# Patient Record
Sex: MALE | URBAN - METROPOLITAN AREA
[De-identification: names, ages, dates, MRNs, and addresses within clinical notes are randomized per-mention and may not be internally consistent; named-entity substitution may affect disease eponyms.]

---

## 2024-08-15 ENCOUNTER — OFFICE VISIT (OUTPATIENT)
Dept: URBAN - METROPOLITAN AREA CLINIC 74 | Facility: CLINIC | Age: 54
End: 2024-08-15
Payer: COMMERCIAL

## 2024-08-15 ENCOUNTER — DASHBOARD ENCOUNTERS (OUTPATIENT)
Age: 54
End: 2024-08-15

## 2024-08-15 ENCOUNTER — LAB OUTSIDE AN ENCOUNTER (OUTPATIENT)
Dept: URBAN - METROPOLITAN AREA CLINIC 74 | Facility: CLINIC | Age: 54
End: 2024-08-15

## 2024-08-15 VITALS
TEMPERATURE: 97.9 F | WEIGHT: 258.4 LBS | HEART RATE: 74 BPM | SYSTOLIC BLOOD PRESSURE: 128 MMHG | HEIGHT: 68 IN | BODY MASS INDEX: 39.16 KG/M2 | OXYGEN SATURATION: 99 % | DIASTOLIC BLOOD PRESSURE: 86 MMHG

## 2024-08-15 DIAGNOSIS — Z12.11 SCREENING FOR COLON CANCER: ICD-10-CM

## 2024-08-15 PROCEDURE — 99202 OFFICE O/P NEW SF 15 MIN: CPT | Performed by: PHYSICIAN ASSISTANT

## 2024-08-15 NOTE — HPI-TODAY'S VISIT:
The patient is 53-year-old male with past medical history as noted below is presenting to our clinic today to schedule colon cancer screening. The patient denies family history of colon polyps or colon cancer. No previous Colonoscopy. He takes Omperazole only as needed when he eats spicey food. No GI issues today.    -- The patient denies dyspepsia, dysphagia, odynophagia, hemoptysis, hematemesis, nausea, vomiting, regurgitation, melena, constipation, diarrhea, abdominal pain, hematochezia, fever, chills, chest pain, SOB, or any other GI complaints today.   -- The patient denies ETOH, Tobacco, and Illicit drug use.   -- The patient is not up to date with Flu and COVID vaccine.  -- Denies NSID's.

## 2024-08-20 ENCOUNTER — TELEPHONE ENCOUNTER (OUTPATIENT)
Dept: URBAN - METROPOLITAN AREA CLINIC 74 | Facility: CLINIC | Age: 54
End: 2024-08-20

## 2024-08-29 ENCOUNTER — OFFICE VISIT (OUTPATIENT)
Dept: URBAN - METROPOLITAN AREA CLINIC 74 | Facility: CLINIC | Age: 54
End: 2024-08-29

## 2024-09-26 ENCOUNTER — OFFICE VISIT (OUTPATIENT)
Dept: URBAN - METROPOLITAN AREA SURGERY CENTER 30 | Facility: SURGERY CENTER | Age: 54
End: 2024-09-26

## 2024-10-23 ENCOUNTER — TELEPHONE ENCOUNTER (OUTPATIENT)
Dept: URBAN - METROPOLITAN AREA CLINIC 74 | Facility: CLINIC | Age: 54
End: 2024-10-23

## 2024-10-24 ENCOUNTER — OFFICE VISIT (OUTPATIENT)
Dept: URBAN - METROPOLITAN AREA TELEHEALTH 2 | Facility: TELEHEALTH | Age: 54
End: 2024-10-24
Payer: COMMERCIAL

## 2024-10-24 DIAGNOSIS — D12.6 TUBULAR ADENOMA OF COLON: ICD-10-CM

## 2024-10-24 DIAGNOSIS — K64.8 INTERNAL HEMORRHOIDS: ICD-10-CM

## 2024-10-24 PROCEDURE — 99441 PHONE E/M BY PHYS 5-10 MIN: CPT | Performed by: PHYSICIAN ASSISTANT

## 2024-10-24 RX ORDER — OMEPRAZOLE 20 MG/1
1 CAPSULE 1/2 TO 1 HOUR BEFORE MORNING MEAL CAPSULE, DELAYED RELEASE ORAL ONCE A DAY
Status: ACTIVE | COMMUNITY

## 2024-10-24 NOTE — HPI-TODAY'S VISIT:
The patient is 53-year-old male with past medical history as noted below known to Dr. Hummel has telemedicine appointment today to discuss his recent colonoscopy results. No GI issues today.   Procedure: -- Colonoscopy with polypectomy on 09/26/2024 by Dr. Hummel noted one 2 mm polyp in the ascending colon, removed with a cold biopsy forceps.  Resected and retrieved.  Internal hemorrhoids.  Repeat colonoscopy in 7 years for surveillance.  Biopsy tubular adenoma colon polyp.